# Patient Record
Sex: MALE | Race: WHITE | NOT HISPANIC OR LATINO | Employment: FULL TIME | URBAN - METROPOLITAN AREA
[De-identification: names, ages, dates, MRNs, and addresses within clinical notes are randomized per-mention and may not be internally consistent; named-entity substitution may affect disease eponyms.]

---

## 2023-11-28 ENCOUNTER — HOSPITAL ENCOUNTER (EMERGENCY)
Facility: CLINIC | Age: 53
Discharge: HOME | End: 2023-11-28
Attending: EMERGENCY MEDICINE | Admitting: EMERGENCY MEDICINE
Payer: COMMERCIAL

## 2023-11-28 ENCOUNTER — OFFICE VISIT (OUTPATIENT)
Dept: URGENT CARE | Facility: URGENT CARE | Age: 53
End: 2023-11-28
Payer: COMMERCIAL

## 2023-11-28 VITALS
HEIGHT: 69 IN | SYSTOLIC BLOOD PRESSURE: 139 MMHG | BODY MASS INDEX: 28.88 KG/M2 | HEART RATE: 72 BPM | OXYGEN SATURATION: 95 % | DIASTOLIC BLOOD PRESSURE: 82 MMHG | RESPIRATION RATE: 16 BRPM | TEMPERATURE: 98.9 F | WEIGHT: 195 LBS

## 2023-11-28 VITALS
TEMPERATURE: 97.9 F | HEART RATE: 80 BPM | OXYGEN SATURATION: 98 % | WEIGHT: 195 LBS | SYSTOLIC BLOOD PRESSURE: 162 MMHG | DIASTOLIC BLOOD PRESSURE: 82 MMHG

## 2023-11-28 DIAGNOSIS — I10 BENIGN ESSENTIAL HYPERTENSION: ICD-10-CM

## 2023-11-28 DIAGNOSIS — R04.0 EPISTAXIS: Primary | ICD-10-CM

## 2023-11-28 DIAGNOSIS — Z79.01 LONG TERM CURRENT USE OF ANTICOAGULANT THERAPY: ICD-10-CM

## 2023-11-28 DIAGNOSIS — R04.0 EPISTAXIS: ICD-10-CM

## 2023-11-28 PROCEDURE — 99204 OFFICE O/P NEW MOD 45 MIN: CPT | Performed by: PHYSICIAN ASSISTANT

## 2023-11-28 PROCEDURE — 99283 EMERGENCY DEPT VISIT LOW MDM: CPT

## 2023-11-28 RX ORDER — VITAMIN B COMPLEX
1 TABLET ORAL DAILY
COMMUNITY

## 2023-11-28 RX ORDER — ASPIRIN 81 MG/1
81 TABLET, CHEWABLE ORAL
COMMUNITY
Start: 2023-05-09

## 2023-11-28 RX ORDER — ATORVASTATIN CALCIUM 40 MG/1
40 TABLET, FILM COATED ORAL
COMMUNITY
Start: 2023-04-07 | End: 2024-04-06

## 2023-11-28 RX ORDER — METOPROLOL TARTRATE 25 MG/1
75 TABLET, FILM COATED ORAL DAILY
COMMUNITY

## 2023-11-28 ASSESSMENT — ACTIVITIES OF DAILY LIVING (ADL): ADLS_ACUITY_SCORE: 35

## 2023-11-28 NOTE — ED PROVIDER NOTES
"  History     Chief Complaint:  Epistaxis (Left nare bleed; UC sent in because pt is on burlenta )       The history is provided by the patient.      Herson Centeno is a 53 year old male on aspirin 81 mg and Brilinta 180 mg who presents with epistaxis. Patient reports he has had some congestion and sinus issues recently. Around 730am today, his nose began bleeding, and he noticed a clot come out of his left nostril. He states he has been trying not to swallow the blood. Patient was seen at Urgent Care, but sent to the ED for cauterization as he is on blood thinners. Denies history of epistaxis. No trauma or recreational drug use in the nose. Notes history of hypertension which is regulated by lisinopril and metoprolol.    Medications:    Lisinopril  Metoprolol  Brilinta  Atorvastatin  Aspirin 81 mg    Past Medical History:   STEMI  Stage 3a CKD  Stented coronary artery  Traumatic rupture of left distal biceps tendon     Past Surgical History:    Bilateral knee surgery  Shoulder surgery R  Tear duct surgery  Coronary artery stent placement    Physical Exam   Patient Vitals for the past 24 hrs:   BP Temp Temp src Pulse Resp SpO2 Height Weight   11/28/23 1307 139/82 98.9  F (37.2  C) Temporal 72 16 95 % 1.753 m (5' 9\") 88.5 kg (195 lb)        Physical Exam  VS: Reviewed per above  HENT: Mucous membranes moist.  Dried blood in the left nare, but no active bleeding vessels.  No blood in the posterior oropharynx appreciated on direct inspection.  EYES: sclera anicteric  CV: Rate as noted  RESP: Effort normal.   NEURO: Alert, moving all extremities  MSK: No deformity of the extremities  SKIN: Warm and dry    Emergency Department Course   Laboratory:  Labs Ordered and Resulted from Time of ED Arrival to Time of ED Departure - No data to display     Emergency Department Course & Assessments:  Interventions:  Medications - No data to display     Assessments/Consultations/Discussion of Management or Tests:   ED Course as of " 11/28/23 1312   Tue Nov 28, 2023   1225 Dr. Hennessy's evaluation   1244 Rechecked.   1308 Rechecked.       Social Determinants of Health affecting care:   None    Disposition:  The patient was discharged to home.     Impression & Plan      Medical Decision Making:  Patient presents to the ER for evaluation of epistaxis from the left nare.  Vital signs reassuring.  On exam, there is dried blood in the nare but no active bleeding.  After patient cleared his nare, Afrin was instilled in the left nare and nasal clamp was applied.  After 15 minutes, this area was reinspected and no ongoing bleeding was appreciated.  Low suspicion for significant blood loss anemia based on history.  He ambulated about the halls of the ER without recurrent bleeding.  Discussed prevention of epistaxis including saline nasal spray and avoiding vigorous blowing of the nose.  Nasal clamp and remainder of Afrin bottle provided to use for any recurrent epistaxis.  Return precautions discussed prior to discharge.    Diagnosis:    ICD-10-CM    1. Epistaxis  R04.0          Scribe Disclosure:  Abbie HADDAD, am serving as a scribe at 12:29 PM on 11/28/2023 to document services personally performed by Andrew Hennessy MD based on my observations and the provider's statements to me.    11/28/2023   Andrew Hennessy MD Lindenbaum, Elan, MD  11/28/23 1423

## 2023-11-28 NOTE — PROGRESS NOTES
Assessment & Plan     Epistaxis    Patient has blood nose with clots that wont stop  He has is packed at this time but keeps bleeding    Patient sent to the ED for cauterization as he is on blood thinners  ENT will not see pt either same day when on blood thinners    Long term current use of anticoagulant therapy    Patient is taking ASA and Brilinta     Benign essential hypertension    Patient advised to follow up with PCP for recheck blood pressure   Information given to patient on diet modifications, including lowering salt in diet, decrease calories, weight loss and exercise.  Monitor blood pressure at home and if BP maintains over 140/90 then advise having a recheck with PCP in 2 weeks.        CONSULTATION/REFERRAL to ED for cauterization    No follow-ups on file.    Christ Fernandes, [facility], PA-C  [facility] BRENNA Bains is a 53 year old, presenting for the following health issues:  Urgent Care (Pt reports a nose bleed. Pt had a sinus infection 2 weeks ago. Pt on blood thinner medication.)      HPI   Review of Systems   Constitutional, HEENT, cardiovascular, pulmonary, gi and gu systems are negative, except as otherwise noted.      Objective    BP (!) 162/82   Pulse 80   Temp 97.9  F (36.6  C) (Tympanic)   Wt 88.5 kg (195 lb)   SpO2 98%   There is no height or weight on file to calculate BMI.  Physical Exam   GENERAL: healthy, alert and no distress  EYES: Eyes grossly normal to inspection, PERRL and conjunctivae and sclerae normal  HENT: Positive for left side nasal bleeding with clotting  NECK: no adenopathy, no asymmetry, masses, or scars and thyroid normal to palpation  MS: no gross musculoskeletal defects noted, no edema  SKIN: no suspicious lesions or rashes  NEURO: Normal strength and tone, mentation intact and speech normal  PSYCH: mentation appears normal, affect normal/bright

## 2024-06-27 RX ORDER — LISINOPRIL 20 MG/1
20 TABLET ORAL DAILY
Qty: 90 TABLET | OUTPATIENT
Start: 2024-06-27